# Patient Record
Sex: MALE | Race: BLACK OR AFRICAN AMERICAN | Employment: STUDENT | URBAN - METROPOLITAN AREA
[De-identification: names, ages, dates, MRNs, and addresses within clinical notes are randomized per-mention and may not be internally consistent; named-entity substitution may affect disease eponyms.]

---
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
## 2021-06-05 ENCOUNTER — APPOINTMENT (OUTPATIENT)
Dept: GENERAL RADIOLOGY | Age: 21
End: 2021-06-05
Attending: EMERGENCY MEDICINE
Payer: COMMERCIAL

## 2021-06-05 ENCOUNTER — HOSPITAL ENCOUNTER (EMERGENCY)
Age: 21
Discharge: HOME | End: 2021-06-05
Attending: EMERGENCY MEDICINE
Payer: COMMERCIAL

## 2021-06-05 VITALS
RESPIRATION RATE: 15 BRPM | OXYGEN SATURATION: 100 % | TEMPERATURE: 98 F | HEIGHT: 70 IN | HEART RATE: 55 BPM | DIASTOLIC BLOOD PRESSURE: 78 MMHG | SYSTOLIC BLOOD PRESSURE: 155 MMHG | WEIGHT: 180 LBS | BODY MASS INDEX: 25.77 KG/M2

## 2021-06-05 DIAGNOSIS — G43.909 MIGRAINE WITHOUT STATUS MIGRAINOSUS, NOT INTRACTABLE, UNSPECIFIED MIGRAINE TYPE: ICD-10-CM

## 2021-06-05 DIAGNOSIS — T79.6XXA TRAUMATIC RHABDOMYOLYSIS, INITIAL ENCOUNTER (HCC): ICD-10-CM

## 2021-06-05 DIAGNOSIS — E86.0 DEHYDRATION: Primary | ICD-10-CM

## 2021-06-05 LAB
ALBUMIN SERPL-MCNC: 4.4 G/DL (ref 3.5–5)
ALBUMIN/GLOB SERPL: 1.1 {RATIO} (ref 1.2–3.5)
ALP SERPL-CCNC: 131 U/L (ref 50–136)
ALT SERPL-CCNC: 49 U/L (ref 12–65)
ANION GAP SERPL CALC-SCNC: 7 MMOL/L (ref 7–16)
AST SERPL-CCNC: 32 U/L (ref 15–37)
BASOPHILS # BLD: 0.1 K/UL (ref 0–0.2)
BASOPHILS NFR BLD: 1 % (ref 0–2)
BILIRUB SERPL-MCNC: 1 MG/DL (ref 0.2–1.1)
BUN SERPL-MCNC: 15 MG/DL (ref 6–23)
CALCIUM SERPL-MCNC: 9.9 MG/DL (ref 8.3–10.4)
CHLORIDE SERPL-SCNC: 106 MMOL/L (ref 98–107)
CK SERPL-CCNC: 600 U/L (ref 21–215)
CO2 SERPL-SCNC: 27 MMOL/L (ref 21–32)
CREAT SERPL-MCNC: 1.29 MG/DL (ref 0.8–1.5)
DIFFERENTIAL METHOD BLD: ABNORMAL
EOSINOPHIL # BLD: 0.2 K/UL (ref 0–0.8)
EOSINOPHIL NFR BLD: 2 % (ref 0.5–7.8)
ERYTHROCYTE [DISTWIDTH] IN BLOOD BY AUTOMATED COUNT: 13.4 % (ref 11.9–14.6)
GLOBULIN SER CALC-MCNC: 4 G/DL (ref 2.3–3.5)
GLUCOSE SERPL-MCNC: 80 MG/DL (ref 65–100)
HCT VFR BLD AUTO: 45.5 % (ref 41.1–50.3)
HGB BLD-MCNC: 15.4 G/DL (ref 13.6–17.2)
IMM GRANULOCYTES # BLD AUTO: 0 K/UL (ref 0–0.5)
IMM GRANULOCYTES NFR BLD AUTO: 0 % (ref 0–5)
LIPASE SERPL-CCNC: 35 U/L (ref 73–393)
LYMPHOCYTES # BLD: 3.8 K/UL (ref 0.5–4.6)
LYMPHOCYTES NFR BLD: 32 % (ref 13–44)
MAGNESIUM SERPL-MCNC: 2.2 MG/DL (ref 1.8–2.4)
MCH RBC QN AUTO: 27.7 PG (ref 26.1–32.9)
MCHC RBC AUTO-ENTMCNC: 33.8 G/DL (ref 31.4–35)
MCV RBC AUTO: 82 FL (ref 79.6–97.8)
MONOCYTES # BLD: 0.8 K/UL (ref 0.1–1.3)
MONOCYTES NFR BLD: 7 % (ref 4–12)
NEUTS SEG # BLD: 7.2 K/UL (ref 1.7–8.2)
NEUTS SEG NFR BLD: 60 % (ref 43–78)
NRBC # BLD: 0 K/UL (ref 0–0.2)
PLATELET # BLD AUTO: 266 K/UL (ref 150–450)
PMV BLD AUTO: 11.9 FL (ref 9.4–12.3)
POTASSIUM SERPL-SCNC: 4 MMOL/L (ref 3.5–5.1)
PROT SERPL-MCNC: 8.4 G/DL (ref 6.3–8.2)
RBC # BLD AUTO: 5.55 M/UL (ref 4.23–5.6)
SODIUM SERPL-SCNC: 140 MMOL/L (ref 136–145)
TROPONIN-HIGH SENSITIVITY: 28.6 PG/ML (ref 0–14)
WBC # BLD AUTO: 12.1 K/UL (ref 4.3–11.1)

## 2021-06-05 PROCEDURE — 84484 ASSAY OF TROPONIN QUANT: CPT

## 2021-06-05 PROCEDURE — 71045 X-RAY EXAM CHEST 1 VIEW: CPT

## 2021-06-05 PROCEDURE — 82550 ASSAY OF CK (CPK): CPT

## 2021-06-05 PROCEDURE — 96374 THER/PROPH/DIAG INJ IV PUSH: CPT

## 2021-06-05 PROCEDURE — 96361 HYDRATE IV INFUSION ADD-ON: CPT

## 2021-06-05 PROCEDURE — 99285 EMERGENCY DEPT VISIT HI MDM: CPT

## 2021-06-05 PROCEDURE — 83735 ASSAY OF MAGNESIUM: CPT

## 2021-06-05 PROCEDURE — 74011250637 HC RX REV CODE- 250/637: Performed by: EMERGENCY MEDICINE

## 2021-06-05 PROCEDURE — 80053 COMPREHEN METABOLIC PANEL: CPT

## 2021-06-05 PROCEDURE — 93005 ELECTROCARDIOGRAM TRACING: CPT

## 2021-06-05 PROCEDURE — 74011250636 HC RX REV CODE- 250/636: Performed by: EMERGENCY MEDICINE

## 2021-06-05 PROCEDURE — 83690 ASSAY OF LIPASE: CPT

## 2021-06-05 PROCEDURE — 85025 COMPLETE CBC W/AUTO DIFF WBC: CPT

## 2021-06-05 RX ORDER — SODIUM CHLORIDE 0.9 % (FLUSH) 0.9 %
5-10 SYRINGE (ML) INJECTION AS NEEDED
Status: DISCONTINUED | OUTPATIENT
Start: 2021-06-05 | End: 2021-06-05 | Stop reason: HOSPADM

## 2021-06-05 RX ORDER — IBUPROFEN 800 MG/1
800 TABLET ORAL
Status: COMPLETED | OUTPATIENT
Start: 2021-06-05 | End: 2021-06-05

## 2021-06-05 RX ORDER — SODIUM CHLORIDE 0.9 % (FLUSH) 0.9 %
5-10 SYRINGE (ML) INJECTION EVERY 8 HOURS
Status: DISCONTINUED | OUTPATIENT
Start: 2021-06-05 | End: 2021-06-05 | Stop reason: HOSPADM

## 2021-06-05 RX ORDER — ONDANSETRON 2 MG/ML
4 INJECTION INTRAMUSCULAR; INTRAVENOUS
Status: COMPLETED | OUTPATIENT
Start: 2021-06-05 | End: 2021-06-05

## 2021-06-05 RX ADMIN — ONDANSETRON 4 MG: 2 INJECTION INTRAMUSCULAR; INTRAVENOUS at 17:24

## 2021-06-05 RX ADMIN — IBUPROFEN 800 MG: 800 TABLET, FILM COATED ORAL at 19:15

## 2021-06-05 RX ADMIN — SODIUM CHLORIDE 1000 ML: 900 INJECTION, SOLUTION INTRAVENOUS at 17:24

## 2021-06-05 RX ADMIN — SODIUM CHLORIDE 1000 ML: 900 INJECTION, SOLUTION INTRAVENOUS at 18:18

## 2021-06-05 NOTE — ED PROVIDER NOTES
Patient presents to the ER complaining of fatigue, lightheadedness and headache. States symptoms started earlier today, he was in the store with his family and all of a sudden began to feel lightheaded. States he had some headache as well. States he left at the store he went to go sit down in the car when he all of a sudden became more flushed and began experience tightness in his chest.  Contacted mother who brought him to the ER to be evaluated. Patient states previous to the incident today he was out doing significant yard work. Denies any fevers. Does report some nausea. Did have one episode of vomiting while having chest x-ray performed today. The history is provided by the patient. Fatigue  This is a new problem. The current episode started 3 to 5 hours ago. The problem has not changed since onset. Pertinent negatives include no speech difficulty, no agitation, no mental status change and no unresponsiveness. There has been no fever. Associated symptoms include shortness of breath, chest pain, vomiting, headaches and nausea. Pertinent negatives include no altered mental status, no choking and no bladder incontinence. Associated medical issues do not include trauma, bleeding disorder or dementia. Past Medical History:   Diagnosis Date    Migraines     Seasonal allergic rhinitis        No past surgical history on file. No family history on file.     Social History     Socioeconomic History    Marital status: SINGLE     Spouse name: Not on file    Number of children: Not on file    Years of education: Not on file    Highest education level: Not on file   Occupational History    Not on file   Tobacco Use    Smoking status: Never Smoker    Smokeless tobacco: Never Used   Substance and Sexual Activity    Alcohol use: No    Drug use: No    Sexual activity: Not on file   Other Topics Concern    Not on file   Social History Narrative    ** Merged History Encounter **          Social Determinants of Health     Financial Resource Strain:     Difficulty of Paying Living Expenses:    Food Insecurity:     Worried About Running Out of Food in the Last Year:     [address] in the Last Year:    Transportation Needs:     Lack of Transportation (Medical):  Lack of Transportation (Non-Medical):    Physical Activity:     Days of Exercise per Week:     Minutes of Exercise per Session:    Stress:     Feeling of Stress :    Social Connections:     Frequency of Communication with Friends and Family:     Frequency of Social Gatherings with Friends and Family:     Attends Confucianist Services:     Active Member of Clubs or Organizations:     Attends Club or Organization Meetings:     Marital Status:    Intimate Partner Violence:     Fear of Current or Ex-Partner:     Emotionally Abused:     Physically Abused:     Sexually Abused: ALLERGIES: Patient has no known allergies. Review of Systems   Constitutional: Positive for fatigue. Negative for chills. HENT: Negative for congestion, dental problem and voice change. Eyes: Negative for photophobia, pain and redness. Respiratory: Positive for chest tightness and shortness of breath. Negative for choking and wheezing. Cardiovascular: Positive for chest pain. Negative for palpitations and leg swelling. Gastrointestinal: Positive for nausea and vomiting. Negative for abdominal distention and abdominal pain. Endocrine: Negative for polydipsia, polyphagia and polyuria. Genitourinary: Negative for bladder incontinence, decreased urine volume, flank pain and urgency. Musculoskeletal: Positive for myalgias. Negative for arthralgias, back pain and neck pain. Skin: Negative for color change and rash. Neurological: Positive for weakness, light-headedness and headaches. Negative for syncope and speech difficulty. Hematological: Negative for adenopathy.    Psychiatric/Behavioral: Negative for agitation and behavioral problems. All other systems reviewed and are negative. Vitals:    06/05/21 1631   BP: 123/82   Pulse: (!) 57   Resp: 14   SpO2: 100%   Weight: 81.6 kg (180 lb)   Height: 5' 10\" (1.778 m)            Physical Exam  Vitals and nursing note reviewed. Constitutional:       General: He is not in acute distress. Appearance: Normal appearance. He is not ill-appearing. HENT:      Head: Normocephalic and atraumatic. Right Ear: Tympanic membrane and external ear normal.      Left Ear: Tympanic membrane and external ear normal.      Nose: Nose normal. No rhinorrhea. Mouth/Throat:      Mouth: Mucous membranes are moist.      Pharynx: No oropharyngeal exudate or posterior oropharyngeal erythema. Eyes:      General:         Right eye: No discharge. Left eye: No discharge. Extraocular Movements: Extraocular movements intact. Pupils: Pupils are equal, round, and reactive to light. Cardiovascular:      Rate and Rhythm: Normal rate and regular rhythm. Pulses: Normal pulses. Heart sounds: Normal heart sounds. Pulmonary:      Effort: Pulmonary effort is normal. No respiratory distress. Breath sounds: Normal breath sounds. No stridor. No wheezing or rhonchi. Abdominal:      General: Abdomen is flat. There is no distension. Palpations: Abdomen is soft. There is no mass. Tenderness: There is no abdominal tenderness. Hernia: No hernia is present. Musculoskeletal:         General: No swelling, tenderness or signs of injury. Normal range of motion. Cervical back: Normal range of motion and neck supple. No rigidity or tenderness. Right lower leg: No edema. Left lower leg: No edema. Skin:     General: Skin is warm and dry. Coloration: Skin is not jaundiced or pale. Findings: No bruising or erythema. Neurological:      General: No focal deficit present. Mental Status: He is alert and oriented to person, place, and time. Cranial Nerves: No cranial nerve deficit. Motor: No weakness. Psychiatric:         Mood and Affect: Mood normal.         Behavior: Behavior normal.          MDM  Number of Diagnoses or Management Options  Dehydration  Migraine without status migrainosus, not intractable, unspecified migraine type  Traumatic rhabdomyolysis, initial encounter [facility])  Diagnosis management comments: Plan to obtain screening labs here. Treat with IV fluids. 6:55 PM  Labs fairly stable, chemistry panel stable. CK-MB mildly elevated at 600. Patient feels better after being treated with IV fluids. No repeat vomiting here. He is making urine without difficulty. I feel he stable for discharge and outpatient follow-up    Patient had a similar episode on presentation to the ER couple years ago. Amount and/or Complexity of Data Reviewed  Clinical lab tests: ordered and reviewed    Risk of Complications, Morbidity, and/or Mortality  Presenting problems: moderate  Diagnostic procedures: moderate  Management options: moderate    Patient Progress  Patient progress: stable         Procedures        Results Include:    Recent Results (from the past 24 hour(s))   TROPONIN-HIGH SENSITIVITY    Collection Time: 06/05/21  4:37 PM   Result Value Ref Range    Troponin-High Sensitivity 28.6 (H) 0 - 14 pg/mL   CBC WITH AUTOMATED DIFF    Collection Time: 06/05/21  4:37 PM   Result Value Ref Range    WBC 12.1 (H) 4.3 - 11.1 K/uL    RBC 5.55 4.23 - 5.6 M/uL    HGB 15.4 13.6 - 17.2 g/dL    HCT 45.5 41.1 - 50.3 %    MCV 82.0 79.6 - 97.8 FL    MCH 27.7 26.1 - 32.9 PG    MCHC 33.8 31.4 - 35.0 g/dL    RDW 13.4 11.9 - 14.6 %    PLATELET 406 271 - 611 K/uL    MPV 11.9 9.4 - 12.3 FL    ABSOLUTE NRBC 0.00 0.0 - 0.2 K/uL    DF AUTOMATED      NEUTROPHILS 60 43 - 78 %    LYMPHOCYTES 32 13 - 44 %    MONOCYTES 7 4.0 - 12.0 %    EOSINOPHILS 2 0.5 - 7.8 %    BASOPHILS 1 0.0 - 2.0 %    IMMATURE GRANULOCYTES 0 0.0 - 5.0 %    ABS.  NEUTROPHILS 7.2 1.7 - 8.2 K/UL    ABS. LYMPHOCYTES 3.8 0.5 - 4.6 K/UL    ABS. MONOCYTES 0.8 0.1 - 1.3 K/UL    ABS. EOSINOPHILS 0.2 0.0 - 0.8 K/UL    ABS. BASOPHILS 0.1 0.0 - 0.2 K/UL    ABS. IMM. GRANS. 0.0 0.0 - 0.5 K/UL   METABOLIC PANEL, COMPREHENSIVE    Collection Time: 06/05/21  4:37 PM   Result Value Ref Range    Sodium 140 136 - 145 mmol/L    Potassium 4.0 3.5 - 5.1 mmol/L    Chloride 106 98 - 107 mmol/L    CO2 27 21 - 32 mmol/L    Anion gap 7 7 - 16 mmol/L    Glucose 80 65 - 100 mg/dL    BUN 15 6 - 23 MG/DL    Creatinine 1.29 0.8 - 1.5 MG/DL    GFR est AA >60 >60 ml/min/1.73m2    GFR est non-AA >60 >60 ml/min/1.73m2    Calcium 9.9 8.3 - 10.4 MG/DL    Bilirubin, total 1.0 0.2 - 1.1 MG/DL    ALT (SGPT) 49 12 - 65 U/L    AST (SGOT) 32 15 - 37 U/L    Alk. phosphatase 131 50 - 136 U/L    Protein, total 8.4 (H) 6.3 - 8.2 g/dL    Albumin 4.4 3.5 - 5.0 g/dL    Globulin 4.0 (H) 2.3 - 3.5 g/dL    A-G Ratio 1.1 (L) 1.2 - 3.5     LIPASE    Collection Time: 06/05/21  4:37 PM   Result Value Ref Range    Lipase 35 (L) 73 - 393 U/L   MAGNESIUM    Collection Time: 06/05/21  4:37 PM   Result Value Ref Range    Magnesium 2.2 1.8 - 2.4 mg/dL   CK    Collection Time: 06/05/21  4:37 PM   Result Value Ref Range     (H) 21 - 215 U/L                      Voice dictation software was used during the making of this note. This software is not perfect and grammatical and other typographical errors may be present. This note has been proofread, but may still contain errors.   Pina Thomas MD; 6/5/2021 @6:57 PM   ===================================================================

## 2021-06-05 NOTE — ED TRIAGE NOTES
Pt to triage via w/c. Pt mother present and is verbally aggressive with staff. Pt states he has tingling in both of his hand but more in the right side. Pt does not mention cp but mother states pt was having cp. Pt was working outside when the s/sx started. Pt denies any cp at this time. Pt mother denies hx of congenital heart disorders. Pt mother reports nausea in route.

## 2021-06-05 NOTE — DISCHARGE INSTRUCTIONS
Drink plenty of fluids  Follow-up with your primary care physician  Follow-up with your neurologist as well concerning her migraines  Return to the ER for any new, worsening or life-threatening symptoms

## 2021-06-06 LAB
ATRIAL RATE: 52 BPM
CALCULATED P AXIS, ECG09: 19 DEGREES
CALCULATED R AXIS, ECG10: 80 DEGREES
CALCULATED T AXIS, ECG11: -39 DEGREES
DIAGNOSIS, 93000: NORMAL
P-R INTERVAL, ECG05: 140 MS
Q-T INTERVAL, ECG07: 398 MS
QRS DURATION, ECG06: 102 MS
QTC CALCULATION (BEZET), ECG08: 370 MS
VENTRICULAR RATE, ECG03: 52 BPM

## 2021-10-22 ENCOUNTER — HOSPITAL ENCOUNTER (OUTPATIENT)
Dept: MRI IMAGING | Age: 21
Discharge: HOME | End: 2021-10-22
Attending: ORTHOPAEDIC SURGERY
Payer: COMMERCIAL

## 2021-10-22 DIAGNOSIS — S83.512A RUPTURE OF ANTERIOR CRUCIATE LIGAMENT OF LEFT KNEE, INITIAL ENCOUNTER: ICD-10-CM

## 2021-10-22 DIAGNOSIS — M25.562 LEFT KNEE PAIN, UNSPECIFIED CHRONICITY: ICD-10-CM

## 2021-10-22 PROCEDURE — 73721 MRI JNT OF LWR EXTRE W/O DYE: CPT

## 2022-05-16 PROBLEM — M76.52 PATELLAR TENDINITIS, LEFT KNEE: Status: ACTIVE | Noted: 2022-05-16

## 2022-06-27 ENCOUNTER — OFFICE VISIT (OUTPATIENT)
Dept: ORTHOPEDIC SURGERY | Age: 22
End: 2022-06-27
Payer: COMMERCIAL

## 2022-06-27 DIAGNOSIS — M76.52 PATELLAR TENDINITIS, LEFT KNEE: Primary | ICD-10-CM

## 2022-06-27 PROCEDURE — 99213 OFFICE O/P EST LOW 20 MIN: CPT | Performed by: ORTHOPAEDIC SURGERY

## 2022-06-27 NOTE — PROGRESS NOTES
Name: Nico Gaspar  YOB: 2000  Gender: male  MRN: [id number]      CC: Follow-up (L knee recheck)       HPI: Nico Gaspar is a 22 y.o. male who returns for follow up on left knee pain. He is well and reports very little to no pain. He has been compliant with his home exercise program. He has also found the cho-pat strap helpful and feels stable especially with ascending stairs. Physical Examination:  General: no acute distress  Lungs: breathing easily  CV: regular rhythm by pulse  Left Knee: Minimal tenderness palpation of the patellar tendon he has good resisted knee extension no effusion full range of motion        Assessment:     ICD-10-CM    1. Patellar tendinitis, left knee  M76.52        Plan:   Progressing well with home exercise quad strengthening program patellar tendinitis program continue use of the strap as needed progressing his activity. He can follow-up as needed. Fredrick Billingsley MD, [facility]

## 2022-07-26 ENCOUNTER — HOSPITAL ENCOUNTER (EMERGENCY)
Age: 22
Discharge: HOME | End: 2022-07-27
Attending: EMERGENCY MEDICINE | Admitting: EMERGENCY MEDICINE
Payer: COMMERCIAL

## 2022-07-26 VITALS
RESPIRATION RATE: 18 BRPM | HEART RATE: 79 BPM | OXYGEN SATURATION: 97 % | DIASTOLIC BLOOD PRESSURE: 79 MMHG | SYSTOLIC BLOOD PRESSURE: 139 MMHG | TEMPERATURE: 99.2 F

## 2022-07-26 DIAGNOSIS — S40.261A INSECT BITE OF RIGHT SHOULDER, INITIAL ENCOUNTER: Primary | ICD-10-CM
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
## 2022-07-26 PROCEDURE — 99284 EMERGENCY DEPT VISIT MOD MDM: CPT | Performed by: EMERGENCY MEDICINE

## 2022-07-26 PROCEDURE — 6370000000 HC RX 637 (ALT 250 FOR IP): Performed by: PHYSICIAN ASSISTANT

## 2022-07-26 PROCEDURE — 96372 THER/PROPH/DIAG INJ SC/IM: CPT | Performed by: EMERGENCY MEDICINE

## 2022-07-26 PROCEDURE — 99284 EMERGENCY DEPT VISIT MOD MDM: CPT | Performed by: PHYSICIAN ASSISTANT

## 2022-07-26 PROCEDURE — 6360000002 HC RX W HCPCS: Performed by: PHYSICIAN ASSISTANT

## 2022-07-26 PROCEDURE — 96372 THER/PROPH/DIAG INJ SC/IM: CPT | Performed by: PHYSICIAN ASSISTANT

## 2022-07-26 RX ORDER — FAMOTIDINE 20 MG/1
20 TABLET, FILM COATED ORAL
Status: COMPLETED | OUTPATIENT
Start: 2022-07-26 | End: 2022-07-26

## 2022-07-26 RX ORDER — DIPHENHYDRAMINE HYDROCHLORIDE 50 MG/ML
25 INJECTION INTRAMUSCULAR; INTRAVENOUS
Status: COMPLETED | OUTPATIENT
Start: 2022-07-26 | End: 2022-07-26

## 2022-07-26 RX ORDER — DEXAMETHASONE SODIUM PHOSPHATE 10 MG/ML
10 INJECTION INTRAMUSCULAR; INTRAVENOUS
Status: COMPLETED | OUTPATIENT
Start: 2022-07-26 | End: 2022-07-26

## 2022-07-26 RX ORDER — PREDNISONE 20 MG/1
20 TABLET ORAL 2 TIMES DAILY
Qty: 10 TABLET | Refills: 0 | Status: SHIPPED | OUTPATIENT
Start: 2022-07-26 | End: 2022-07-31

## 2022-07-26 RX ADMIN — FAMOTIDINE 20 MG: 20 TABLET, FILM COATED ORAL at 18:08

## 2022-07-26 RX ADMIN — DIPHENHYDRAMINE HYDROCHLORIDE 25 MG: 50 INJECTION, SOLUTION INTRAMUSCULAR; INTRAVENOUS at 18:08

## 2022-07-26 RX ADMIN — DEXAMETHASONE SODIUM PHOSPHATE 10 MG: 10 INJECTION INTRAMUSCULAR; INTRAVENOUS at 18:08

## 2022-07-26 ASSESSMENT — PAIN SCALES - GENERAL: PAINLEVEL_OUTOF10: 5

## 2022-07-26 ASSESSMENT — ENCOUNTER SYMPTOMS
WHEEZING: 0
NAUSEA: 0
SHORTNESS OF BREATH: 0

## 2022-07-26 ASSESSMENT — PAIN - FUNCTIONAL ASSESSMENT: PAIN_FUNCTIONAL_ASSESSMENT: 0-10

## 2022-07-26 NOTE — DISCHARGE INSTRUCTIONS
Take steroids as prescribed to help with itching or swelling. Follow-up with your PCP in 1 to 2 days if no improvement. Return to the ER for any new or worsening symptoms.

## 2022-07-26 NOTE — ED PROVIDER NOTES
[facility] Emergency Department Provider Note                   PCP:                None Provider               Age: 22 y.o. Sex: male       ICD-10-CM    1. Insect bite of right shoulder, initial encounter  S40.261A     W57. XXXA           DISPOSITION Decision To Discharge 07/26/2022 06:41:16 PM        MDM  Number of Diagnoses or Management Options  Insect bite of right shoulder, initial encounter  Diagnosis management comments: DDx: Insect bite, allergic dermatitis, angioedema, anaphylaxis, contact dermatitis    In summary this a well-appearing 56-year-old male who presented today for insect bite to the posterior right shoulder that has been increasing in pain and itching ever since. On exam there is insect bite noted with surrounding erythematous maculopapular rash consistent with an allergic dermatitis. Patient denies any feelings of tongue or lip swelling or any trouble breathing, no signs of angioedema on exam.  Patient treated with steroids and antihistamines here, will discharge home with prescription for prednisone. Patient Progress  Patient progress: stable       No orders of the defined types were placed in this encounter. Maribel Orr is a 22 y.o. male who presents to the Emergency Department with chief complaint of    Chief Complaint   Patient presents with    Insect Bite      56-year-old male presents today for evaluation of insect bite to the posterior right shoulder. Believes he was stung by some sort of bee or wasp. He states that he has had increased pain, itching and swelling to the area ever since. Incident occurred 2 days ago. He denies having any associated tongue swelling, lip swelling or throat swelling or itching. No difficulty swallowing or speaking. He states he was outside today and it was very hot and he felt slightly short of breath but this improved once he came inside to the air conditioning. He denies having any shortness of breath here in the ER today.   No history of anaphylaxis in the past.  No recent hospitalizations or surgeries. He did try some Benadryl at home which did not seem to help. The history is provided by the patient. No  was used. Review of Systems   Constitutional:  Negative for activity change. Respiratory:  Negative for shortness of breath and wheezing. Cardiovascular:  Negative for chest pain. Gastrointestinal:  Negative for nausea. Skin:  Positive for rash. Allergic/Immunologic: Negative for environmental allergies and food allergies. Neurological:  Negative for dizziness and light-headedness. Past Medical History:   Diagnosis Date    Migraines     Seasonal allergic rhinitis         History reviewed. No pertinent surgical history. History reviewed. No pertinent family history. Social History     Socioeconomic History    Marital status: Single     Spouse name: None    Number of children: None    Years of education: None    Highest education level: None   Tobacco Use    Smoking status: Never    Smokeless tobacco: Never   Vaping Use    Vaping Use: Never used   Substance and Sexual Activity    Alcohol use: No    Drug use: No   Social History Narrative    ** Merged History Encounter **              Patient has no known allergies. Previous Medications    DIPHENHYDRAMINE (BENADRYL) 25 MG CAPSULE    Take 25 mg by mouth every 6 hours as needed        Vitals signs and nursing note reviewed. Patient Vitals for the past 4 hrs:   Temp Pulse Resp BP SpO2   07/26/22 1755 99.2 °F (37.3 °C) 79 18 139/79 97 %          Physical Exam  Vitals and nursing note reviewed. Constitutional:       General: He is not in acute distress. Appearance: Normal appearance. HENT:      Head: Normocephalic and atraumatic. Mouth/Throat:      Mouth: Mucous membranes are moist.      Pharynx: Oropharynx is clear. No oropharyngeal exudate or posterior oropharyngeal erythema.       Comments: No tongue swelling, no signs of angioedema  Eyes:      Conjunctiva/sclera: Conjunctivae normal.      Comments: No periorbital or facial swelling. Cardiovascular:      Rate and Rhythm: Normal rate and regular rhythm. Pulmonary:      Effort: Pulmonary effort is normal.      Breath sounds: No stridor. Skin:     General: Skin is warm and dry. Capillary Refill: Capillary refill takes less than 2 seconds. Neurological:      General: No focal deficit present. Mental Status: He is alert and oriented to person, place, and time. Psychiatric:         Mood and Affect: Mood normal.         Thought Content: Thought content normal.         Judgment: Judgment normal.        Procedures      Labs Reviewed - No data to display     No orders to display                          Voice dictation software was used during the making of this note. This software is not perfect and grammatical and other typographical errors may be present. This note has not been completely proofread for errors.        Eduardo Alcazar  07/26/22 1534

## 2022-12-03 ENCOUNTER — APPOINTMENT (OUTPATIENT)
Dept: GENERAL RADIOLOGY | Age: 22
End: 2022-12-03
Payer: COMMERCIAL

## 2022-12-03 ENCOUNTER — HOSPITAL ENCOUNTER (EMERGENCY)
Age: 22
Discharge: HOME | End: 2022-12-03
Attending: PHYSICIAN ASSISTANT
Payer: COMMERCIAL

## 2022-12-03 VITALS
TEMPERATURE: 98.1 F | WEIGHT: 150 LBS | SYSTOLIC BLOOD PRESSURE: 112 MMHG | OXYGEN SATURATION: 97 % | BODY MASS INDEX: 21.52 KG/M2 | RESPIRATION RATE: 18 BRPM | HEART RATE: 74 BPM | DIASTOLIC BLOOD PRESSURE: 71 MMHG

## 2022-12-03 DIAGNOSIS — R11.2 NAUSEA AND VOMITING, UNSPECIFIED VOMITING TYPE: Primary | ICD-10-CM

## 2022-12-03 DIAGNOSIS — J40 BRONCHITIS: ICD-10-CM

## 2022-12-03 LAB
ALBUMIN SERPL-MCNC: 4.3 G/DL (ref 3.5–5)
ALBUMIN/GLOB SERPL: 1 {RATIO} (ref 0.4–1.6)
ALP SERPL-CCNC: 92 U/L (ref 50–136)
ALT SERPL-CCNC: 49 U/L (ref 12–65)
AMPHET UR QL SCN: NEGATIVE
ANION GAP SERPL CALC-SCNC: 7 MMOL/L (ref 2–11)
APPEARANCE UR: ABNORMAL
AST SERPL-CCNC: 28 U/L (ref 15–37)
BACTERIA URNS QL MICRO: NEGATIVE /HPF
BARBITURATES UR QL SCN: NEGATIVE
BASOPHILS # BLD: 0 K/UL (ref 0–0.2)
BASOPHILS NFR BLD: 1 % (ref 0–2)
BENZODIAZ UR QL: NEGATIVE
BILIRUB SERPL-MCNC: 0.5 MG/DL (ref 0.2–1.1)
BILIRUB UR QL: NEGATIVE
BUN SERPL-MCNC: 17 MG/DL (ref 6–23)
CALCIUM SERPL-MCNC: 9.6 MG/DL (ref 8.3–10.4)
CANNABINOIDS UR QL SCN: POSITIVE
CASTS URNS QL MICRO: ABNORMAL /LPF
CHLORIDE SERPL-SCNC: 104 MMOL/L (ref 101–110)
CO2 SERPL-SCNC: 28 MMOL/L (ref 21–32)
COCAINE UR QL SCN: NEGATIVE
COLOR UR: ABNORMAL
CREAT SERPL-MCNC: 1.2 MG/DL (ref 0.8–1.5)
DIFFERENTIAL METHOD BLD: ABNORMAL
EOSINOPHIL # BLD: 0 K/UL (ref 0–0.8)
EOSINOPHIL NFR BLD: 0 % (ref 0.5–7.8)
EPI CELLS #/AREA URNS HPF: ABNORMAL /HPF
ERYTHROCYTE [DISTWIDTH] IN BLOOD BY AUTOMATED COUNT: 12.6 % (ref 11.9–14.6)
FLUAV AG NPH QL IA: NEGATIVE
FLUBV AG NPH QL IA: NEGATIVE
GLOBULIN SER CALC-MCNC: 4.5 G/DL (ref 2.8–4.5)
GLUCOSE SERPL-MCNC: 113 MG/DL (ref 65–100)
GLUCOSE UR STRIP.AUTO-MCNC: NEGATIVE MG/DL
HCT VFR BLD AUTO: 47.3 % (ref 41.1–50.3)
HGB BLD-MCNC: 16.3 G/DL (ref 13.6–17.2)
HGB UR QL STRIP: NEGATIVE
IMM GRANULOCYTES # BLD AUTO: 0 K/UL (ref 0–0.5)
IMM GRANULOCYTES NFR BLD AUTO: 0 % (ref 0–5)
KETONES UR QL STRIP.AUTO: 15 MG/DL
LEUKOCYTE ESTERASE UR QL STRIP.AUTO: NEGATIVE
LIPASE SERPL-CCNC: 341 U/L (ref 73–393)
LYMPHOCYTES # BLD: 1.3 K/UL (ref 0.5–4.6)
LYMPHOCYTES NFR BLD: 21 % (ref 13–44)
MAGNESIUM SERPL-MCNC: 2.5 MG/DL (ref 1.8–2.4)
MCH RBC QN AUTO: 28.2 PG (ref 26.1–32.9)
MCHC RBC AUTO-ENTMCNC: 34.5 G/DL (ref 31.4–35)
MCV RBC AUTO: 82 FL (ref 82–102)
METHADONE UR QL: NEGATIVE
MONOCYTES # BLD: 0.4 K/UL (ref 0.1–1.3)
MONOCYTES NFR BLD: 7 % (ref 4–12)
NEUTS SEG # BLD: 4.2 K/UL (ref 1.7–8.2)
NEUTS SEG NFR BLD: 71 % (ref 43–78)
NITRITE UR QL STRIP.AUTO: NEGATIVE
NRBC # BLD: 0 K/UL (ref 0–0.2)
OPIATES UR QL: NEGATIVE
PCP UR QL: NEGATIVE
PH UR STRIP: 5.5 [PH] (ref 5–9)
PLATELET # BLD AUTO: 239 K/UL (ref 150–450)
PMV BLD AUTO: 11.2 FL (ref 9.4–12.3)
POTASSIUM SERPL-SCNC: 3.5 MMOL/L (ref 3.5–5.1)
PROT SERPL-MCNC: 8.8 G/DL (ref 6.3–8.2)
PROT UR STRIP-MCNC: 30 MG/DL
RBC # BLD AUTO: 5.77 M/UL (ref 4.23–5.6)
RBC #/AREA URNS HPF: ABNORMAL /HPF
SARS-COV-2 RDRP RESP QL NAA+PROBE: NOT DETECTED
SODIUM SERPL-SCNC: 139 MMOL/L (ref 133–143)
SOURCE: NORMAL
SP GR UR REFRACTOMETRY: 1.02 (ref 1–1.02)
SPECIMEN SOURCE: NORMAL
STREP, MOLECULAR: NOT DETECTED
UROBILINOGEN UR QL STRIP.AUTO: 0.2 EU/DL (ref 0.2–1)
WBC # BLD AUTO: 5.9 K/UL (ref 4.3–11.1)
WBC URNS QL MICRO: ABNORMAL /HPF

## 2022-12-03 PROCEDURE — 87651 STREP A DNA AMP PROBE: CPT

## 2022-12-03 PROCEDURE — 81001 URINALYSIS AUTO W/SCOPE: CPT

## 2022-12-03 PROCEDURE — 83735 ASSAY OF MAGNESIUM: CPT

## 2022-12-03 PROCEDURE — 6370000000 HC RX 637 (ALT 250 FOR IP): Performed by: PHYSICIAN ASSISTANT

## 2022-12-03 PROCEDURE — 2580000003 HC RX 258: Performed by: PHYSICIAN ASSISTANT

## 2022-12-03 PROCEDURE — 96374 THER/PROPH/DIAG INJ IV PUSH: CPT

## 2022-12-03 PROCEDURE — 85025 COMPLETE CBC W/AUTO DIFF WBC: CPT

## 2022-12-03 PROCEDURE — 6360000002 HC RX W HCPCS: Performed by: EMERGENCY MEDICINE

## 2022-12-03 PROCEDURE — 6370000000 HC RX 637 (ALT 250 FOR IP): Performed by: EMERGENCY MEDICINE

## 2022-12-03 PROCEDURE — 96375 TX/PRO/DX INJ NEW DRUG ADDON: CPT

## 2022-12-03 PROCEDURE — 87635 SARS-COV-2 COVID-19 AMP PRB: CPT

## 2022-12-03 PROCEDURE — 71046 X-RAY EXAM CHEST 2 VIEWS: CPT

## 2022-12-03 PROCEDURE — 83690 ASSAY OF LIPASE: CPT

## 2022-12-03 PROCEDURE — 80053 COMPREHEN METABOLIC PANEL: CPT

## 2022-12-03 PROCEDURE — 99284 EMERGENCY DEPT VISIT MOD MDM: CPT

## 2022-12-03 PROCEDURE — 80307 DRUG TEST PRSMV CHEM ANLYZR: CPT

## 2022-12-03 PROCEDURE — 87804 INFLUENZA ASSAY W/OPTIC: CPT

## 2022-12-03 PROCEDURE — 6360000002 HC RX W HCPCS: Performed by: PHYSICIAN ASSISTANT

## 2022-12-03 RX ORDER — DIPHENHYDRAMINE HYDROCHLORIDE 50 MG/ML
25 INJECTION INTRAMUSCULAR; INTRAVENOUS
Status: COMPLETED | OUTPATIENT
Start: 2022-12-03 | End: 2022-12-03

## 2022-12-03 RX ORDER — ONDANSETRON 8 MG/1
8 TABLET, ORALLY DISINTEGRATING ORAL EVERY 8 HOURS PRN
Qty: 12 TABLET | Refills: 1 | Status: SHIPPED | OUTPATIENT
Start: 2022-12-03

## 2022-12-03 RX ORDER — 0.9 % SODIUM CHLORIDE 0.9 %
1000 INTRAVENOUS SOLUTION INTRAVENOUS
Status: COMPLETED | OUTPATIENT
Start: 2022-12-03 | End: 2022-12-03

## 2022-12-03 RX ORDER — BENZONATATE 200 MG/1
200 CAPSULE ORAL 3 TIMES DAILY PRN
Qty: 21 CAPSULE | Refills: 0 | Status: SHIPPED | OUTPATIENT
Start: 2022-12-03

## 2022-12-03 RX ORDER — HYOSCYAMINE SULFATE 0.12 MG/1
0.25 TABLET SUBLINGUAL 4 TIMES DAILY PRN
Qty: 20 EACH | Refills: 1 | Status: SHIPPED | OUTPATIENT
Start: 2022-12-03

## 2022-12-03 RX ORDER — AZITHROMYCIN 250 MG/1
250 TABLET, FILM COATED ORAL DAILY
Qty: 4 TABLET | Refills: 0 | Status: SHIPPED | OUTPATIENT
Start: 2022-12-03 | End: 2022-12-07

## 2022-12-03 RX ORDER — BENZONATATE 100 MG/1
200 CAPSULE ORAL
Status: COMPLETED | OUTPATIENT
Start: 2022-12-03 | End: 2022-12-03

## 2022-12-03 RX ORDER — PROCHLORPERAZINE EDISYLATE 5 MG/ML
10 INJECTION INTRAMUSCULAR; INTRAVENOUS
Status: COMPLETED | OUTPATIENT
Start: 2022-12-03 | End: 2022-12-03

## 2022-12-03 RX ORDER — AZITHROMYCIN 250 MG/1
500 TABLET, FILM COATED ORAL
Status: COMPLETED | OUTPATIENT
Start: 2022-12-03 | End: 2022-12-03

## 2022-12-03 RX ORDER — OMEPRAZOLE 20 MG/1
20 CAPSULE, DELAYED RELEASE ORAL
Qty: 90 CAPSULE | Refills: 3 | Status: SHIPPED | OUTPATIENT
Start: 2022-12-03

## 2022-12-03 RX ORDER — ONDANSETRON 4 MG/1
4 TABLET, ORALLY DISINTEGRATING ORAL ONCE
Status: COMPLETED | OUTPATIENT
Start: 2022-12-03 | End: 2022-12-03

## 2022-12-03 RX ADMIN — SODIUM CHLORIDE 1000 ML: 9 INJECTION, SOLUTION INTRAVENOUS at 21:38

## 2022-12-03 RX ADMIN — AZITHROMYCIN MONOHYDRATE 500 MG: 250 TABLET ORAL at 22:46

## 2022-12-03 RX ADMIN — PROCHLORPERAZINE EDISYLATE 10 MG: 5 INJECTION INTRAMUSCULAR; INTRAVENOUS at 21:38

## 2022-12-03 RX ADMIN — BENZONATATE 200 MG: 100 CAPSULE ORAL at 22:46

## 2022-12-03 RX ADMIN — DIPHENHYDRAMINE HYDROCHLORIDE 25 MG: 50 INJECTION, SOLUTION INTRAMUSCULAR; INTRAVENOUS at 22:25

## 2022-12-03 RX ADMIN — ONDANSETRON 4 MG: 4 TABLET, ORALLY DISINTEGRATING ORAL at 19:30

## 2022-12-03 ASSESSMENT — ENCOUNTER SYMPTOMS
COUGH: 1
GASTROINTESTINAL NEGATIVE: 1
SORE THROAT: 1
SHORTNESS OF BREATH: 0
RHINORRHEA: 1
WHEEZING: 0
SINUS CONGESTION: 0
EYE DISCHARGE: 0
ALLERGIC/IMMUNOLOGIC NEGATIVE: 1
EYES NEGATIVE: 1

## 2022-12-03 ASSESSMENT — PAIN - FUNCTIONAL ASSESSMENT: PAIN_FUNCTIONAL_ASSESSMENT: 0-10

## 2022-12-03 ASSESSMENT — PAIN SCALES - GENERAL: PAINLEVEL_OUTOF10: 8

## 2022-12-03 ASSESSMENT — PAIN DESCRIPTION - LOCATION: LOCATION: ABDOMEN

## 2022-12-04 NOTE — ED TRIAGE NOTES
Pt ambulatory to ED with mother. Pt reports was \"sick\" two weeks ago - cough, feeling unwell, family member with flu - then began feeling better. Cough returned after one day. This afternoon began coughing up dark brown pt states is blood. Denies bright red blood. Pt also c/o N/V/D and abdominal pain.

## 2022-12-04 NOTE — DISCHARGE INSTRUCTIONS
Zofran as needed for nausea  Prilosec daily  Levsin as needed for stomach cramps or pain    Continue the Zithromax tomorrow night for 4 more nights to complete the Z-Mikel antibiotic for the bronchitis  Tessalon Perles every 8 hours for cough  Take 1200 mg of Mucinex DM every 12 hours  Alternate 4 ibuprofen every 8 hours with 2 extra-strength tylenol every 6 hours

## 2022-12-04 NOTE — ED PROVIDER NOTES
Care of patient assumed at 10 PM with instructions to sharan down labs. Labs look good, patient developed some akathisia after the Compazine, Benadryl administered IV,  We will go ahead and start treatment for bronchitis with Z-Mikel and Tessalon, Zofran and Prilosec to go home.   SHILPI Bal.     Michelle Vyas MD  12/03/22 4992

## 2022-12-04 NOTE — ED PROVIDER NOTES
Emergency Department Provider Note                   PCP:                Md Carolyn Alvarado               Age: 25 y.o. Sex: male     No diagnosis found. DISPOSITION          MDM     Amount and/or Complexity of Data Reviewed  Clinical lab tests: ordered    Risk of Complications, Morbidity, and/or Mortality  Presenting problems: moderate  Diagnostic procedures: moderate  Management options: moderate  General comments: Care of patient transferred to Dr. Brock Mccabe at shift change. Work up pending. Orders Placed This Encounter   Procedures    Rapid influenza A/B antigens    COVID-19, Rapid    Group A Strep Screen By PCR    XR CHEST (2 VW)    Urinalysis w rflx microscopic    Urine Drug Screen    CBC with Auto Differential    CMP    Lipase    Magnesium    Saline lock IV        Medications   prochlorperazine (COMPAZINE) injection 10 mg (has no administration in time range)   0.9 % sodium chloride bolus (has no administration in time range)   ondansetron (ZOFRAN-ODT) disintegrating tablet 4 mg (4 mg Oral Given 12/3/22 1930)       New Prescriptions    No medications on file        Jayne Severs is a 25 y.o. male who presents to the Emergency Department with chief complaint of    Chief Complaint   Patient presents with    Cough    Emesis    Abdominal Pain    Diarrhea      Patient started 2 to 3 days ago with fever, sore throat, body aches, congestion, cough, subjective fevers and not feeling well. His family has been positive for the flu. He had a cough approximately 2 weeks ago that seemed to resolve and then he started with this. He has had nausea, vomiting and diarrhea at home prior to arrival. No chest pain, shortness of breath, abdominal pain, dizziness, weakness, dyspnea exertion, orthopnea, swelling/tingling or weakness to their arms or legs, trouble with urination or bowel movements or other new symptoms. They did ambulate to the room without difficulty and is well hydrated.       The history is provided by the patient. Cough  Cough characteristics:  Productive  Sputum characteristics:  Josias Goldsmith  Severity:  Moderate  Onset quality:  Gradual  Duration:  3 days  Timing:  Constant  Progression:  Worsening  Chronicity:  New  Smoker: no    Context: sick contacts and upper respiratory infection    Context: not animal exposure, not exposure to allergens, not fumes, not occupational exposure, not smoke exposure, not weather changes and not with activity    Relieved by:  Nothing  Worsened by:  Nothing  Ineffective treatments:  None tried  Associated symptoms: chills, fever, headaches, myalgias, rhinorrhea and sore throat    Associated symptoms: no chest pain, no diaphoresis, no ear fullness, no ear pain, no eye discharge, no rash, no shortness of breath, no sinus congestion, no weight loss and no wheezing        Review of Systems   Constitutional:  Positive for chills and fever. Negative for diaphoresis and weight loss. HENT:  Positive for rhinorrhea and sore throat. Negative for ear pain. Eyes: Negative. Negative for discharge. Respiratory:  Positive for cough. Negative for shortness of breath and wheezing. Cardiovascular: Negative. Negative for chest pain. Gastrointestinal: Negative. Endocrine: Negative. Genitourinary: Negative. Musculoskeletal:  Positive for myalgias. Skin: Negative. Negative for rash. Allergic/Immunologic: Negative. Neurological:  Positive for headaches. Hematological: Negative. Psychiatric/Behavioral: Negative. All other systems reviewed and are negative. Past Medical History:   Diagnosis Date    Migraines     Seasonal allergic rhinitis         Past Surgical History:   Procedure Laterality Date    KNEE SURGERY Left         History reviewed. No pertinent family history.      Social History     Socioeconomic History    Marital status: Single     Spouse name: None    Number of children: None    Years of education: None    Highest education level: None Tobacco Use    Smoking status: Never    Smokeless tobacco: Never   Vaping Use    Vaping Use: Every day    Substances: Nicotine   Substance and Sexual Activity    Alcohol use: No    Drug use: Yes     Types: Marijuana Daril Demond)   Social History Narrative    ** Merged History Encounter **              Patient has no known allergies. Previous Medications    DIPHENHYDRAMINE (BENADRYL) 25 MG CAPSULE    Take 25 mg by mouth every 6 hours as needed        Vitals signs and nursing note reviewed. Patient Vitals for the past 4 hrs:   Temp Pulse Resp BP SpO2   12/03/22 1909 98.1 °F (36.7 °C) 73 18 133/87 98 %   12/03/22 1830 98.6 °F (37 °C) 91 20 (!) 157/96 97 %          Physical Exam  Vitals and nursing note reviewed. Constitutional:       Appearance: He is well-developed and normal weight. HENT:      Head: Normocephalic and atraumatic. Right Ear: Tympanic membrane, ear canal and external ear normal.      Left Ear: Tympanic membrane, ear canal and external ear normal.      Nose: Nose normal.      Mouth/Throat:      Mouth: Mucous membranes are moist.      Pharynx: Posterior oropharyngeal erythema present. Eyes:      Extraocular Movements: Extraocular movements intact. Conjunctiva/sclera: Conjunctivae normal.      Pupils: Pupils are equal, round, and reactive to light. Cardiovascular:      Rate and Rhythm: Normal rate and regular rhythm. Pulses: Normal pulses. Heart sounds: Normal heart sounds. Pulmonary:      Effort: Pulmonary effort is normal.      Breath sounds: Normal breath sounds. Abdominal:      General: Abdomen is flat. Palpations: Abdomen is soft. Musculoskeletal:         General: Normal range of motion. Cervical back: Normal range of motion and neck supple. Lymphadenopathy:      Cervical: Cervical adenopathy present. Skin:     General: Skin is warm. Capillary Refill: Capillary refill takes less than 2 seconds.    Neurological:      General: No focal deficit present. Mental Status: He is alert and oriented to person, place, and time. Psychiatric:         Mood and Affect: Mood normal.         Behavior: Behavior normal.        Procedures    Results for orders placed or performed during the hospital encounter of 12/03/22   Rapid influenza A/B antigens    Specimen: Nasal Washing   Result Value Ref Range    Influenza A Ag Negative NEG      Influenza B Ag Negative NEG      Source Nasopharyngeal     COVID-19, Rapid    Specimen: Nasopharyngeal   Result Value Ref Range    Source NASAL      SARS-CoV-2, Rapid Not detected NOTD     Group A Strep Screen By PCR    Specimen: Swab   Result Value Ref Range    Strep, Molecular Not detected NOTD     XR CHEST (2 VW)    Narrative    EXAMINATION: XR CHEST (2 VW) 12/3/2022 7:32 PM    ACCESSION NUMBER: [id number]    COMPARISON: Chest radiograph 6/5/2021. INDICATION: Cough, fever    TECHNIQUE: PA and lateral views of the chest were obtained. FINDINGS:   No focal consolidation. No pulmonary edema. No pneumothorax or sizable pleural effusion. Unremarkable cardiomediastinal silhouette. Impression    No acute airspace disease. XR CHEST (2 VW)   Final Result   No acute airspace disease. Voice dictation software was used during the making of this note. This software is not perfect and grammatical and other typographical errors may be present. This note has not been completely proofread for errors.      Elodia Spurling, PA  12/03/22 8280

## 2022-12-04 NOTE — ED NOTES
I have reviewed discharge instructions with the patient. The patient verbalized understanding. Patient left ED via Discharge Method: ambulatory to Home with (Mother). Opportunity for questions and clarification provided. Patient given 5 scripts. To continue your aftercare when you leave the hospital, you may receive an automated call from our care team to check in on how you are doing. This is a free service and part of our promise to provide the best care and service to meet your aftercare needs.  If you have questions, or wish to unsubscribe from this service please call [phone]. Thank you for Choosing our [address] [place] [address] Emergency Department.         Mitchell Victor RN  12/03/22 6646

## (undated) DEVICE — SPLINT MAT XF SPEC 5X30IN --

## (undated) DEVICE — BUTTON SWITCH PENCIL BLADE ELECTRODE, HOLSTER: Brand: EDGE

## (undated) DEVICE — INTENDED FOR TISSUE SEPARATION, AND OTHER PROCEDURES THAT REQUIRE A SHARP SURGICAL BLADE TO PUNCTURE OR CUT.: Brand: BARD-PARKER SAFETY BLADES SIZE 15, STERILE

## (undated) DEVICE — DRAPE TWL SURG 16X26IN BLU ORB04] ALLCARE INC]

## (undated) DEVICE — TETRA-FLEX CF WOVEN LATEX FREE ELASTIC BANDAGE 6" X 11 YD: Brand: TETRA-FLEX™CF

## (undated) DEVICE — RETRIEVER SUT ARTHSCP HOFFEE --

## (undated) DEVICE — BIT DRL L145MM DIA3.2MM QUIK CPL W/O STP REUSE

## (undated) DEVICE — SLIM BODY SKIN STAPLER: Brand: APPOSE ULC

## (undated) DEVICE — ZIMMER® STERILE DISPOSABLE TOURNIQUET CUFF WITH PLC, DUAL PORT, SINGLE BLADDER, 24 IN. (61 CM)

## (undated) DEVICE — SUTURE MCRYL SZ 2-0 L27IN ABSRB VLT CT-2 L26MM 1/2 CIR Y333H

## (undated) DEVICE — DRAPE SHT 3 QTR PROXIMA 53X77 --

## (undated) DEVICE — BIT DRL L110MM DIA3.5MM QUIK CPL W/O STP REUSE

## (undated) DEVICE — SOLUTION IV 1000ML 0.9% SOD CHL

## (undated) DEVICE — COTTON ROLL,1 LB: Brand: CURITY

## (undated) DEVICE — REM POLYHESIVE ADULT PATIENT RETURN ELECTRODE: Brand: VALLEYLAB

## (undated) DEVICE — SUTURE ABSRB MFIL VLT CT NO 0 27IN MCRYL Y334H

## (undated) DEVICE — Device

## (undated) DEVICE — (D)PREP SKN CHLRAPRP APPL 26ML -- CONVERT TO ITEM 371833

## (undated) DEVICE — SUTURE ETHBND EXCEL SZ 2 L30IN NONABSORBABLE GRN L40MM V-37 MX69G

## (undated) DEVICE — OCCLUSIVE GAUZE STRIP,3% BISMUTH TRIBROMOPHENATE IN PETROLATUM BLEND: Brand: XEROFORM

## (undated) DEVICE — 2.5MM DRILL BIT/QC/GOLD/110MM

## (undated) DEVICE — BIT DRL L180MM DIA2.5MM G QUIK CPL W/O STP REUSE

## (undated) DEVICE — X-LARGE COTTON GLOVE: Brand: DEROYAL

## (undated) DEVICE — BIT DRL L145MM DIA4.5MM QUIK CPL W/O STP REUSE

## (undated) DEVICE — 2000CC GUARDIAN II: Brand: GUARDIAN